# Patient Record
(demographics unavailable — no encounter records)

---

## 2024-11-08 NOTE — HISTORY OF PRESENT ILLNESS
[FreeTextEntry1] : Doing well, she is on Prolia for Osteoporosis. Her Calcium is normal. The Vitamin D is normal. Here FBS is normal but the hbA1c remains 6.1%. She has lost weight.

## 2024-11-08 NOTE — ASSESSMENT
[FreeTextEntry1] : Patient is doing well The osteoporosis is controlled with Prolia  The prediabetes is stable She is losing weight Advised to continue following the diet and exercise

## 2025-03-14 NOTE — ASSESSMENT
[FreeTextEntry1] : The Prediabetes has improved She is trying to lose weight Advised to follow the diet and exercise Her Calcium and Vitamin D levels are normal She is taking Calcium plus Vitamin D regularly She is due for the Prolia on 5/25

## 2025-03-14 NOTE — DATA REVIEWED
[FreeTextEntry1] : The FBS was normal and the hbA1c has improved. Her Calcium and Vitamin D are normal. The lipid panel was fine. The TSH and Free T4 were normal.

## 2025-03-14 NOTE — HISTORY OF PRESENT ILLNESS
[FreeTextEntry1] : Patient is doing well; she has gained weight. Taking regularly her Calcium and Vitamin. She is trying to follow the diet and exercising. She is getting Prolia for Osteoporosis. The last DEXA scan was 12/23